# Patient Record
Sex: MALE | Race: WHITE | NOT HISPANIC OR LATINO | URBAN - METROPOLITAN AREA
[De-identification: names, ages, dates, MRNs, and addresses within clinical notes are randomized per-mention and may not be internally consistent; named-entity substitution may affect disease eponyms.]

---

## 2020-11-30 ENCOUNTER — TELEPHONE ENCOUNTER (OUTPATIENT)
Dept: URBAN - METROPOLITAN AREA CLINIC 35 | Facility: CLINIC | Age: 60
End: 2020-11-30

## 2020-12-19 ENCOUNTER — WEB ENCOUNTER (OUTPATIENT)
Dept: URBAN - METROPOLITAN AREA CLINIC 35 | Facility: CLINIC | Age: 60
End: 2020-12-19

## 2020-12-29 ENCOUNTER — TELEPHONE ENCOUNTER (OUTPATIENT)
Dept: URBAN - METROPOLITAN AREA CLINIC 35 | Facility: CLINIC | Age: 60
End: 2020-12-29

## 2021-01-11 ENCOUNTER — OFFICE VISIT (OUTPATIENT)
Dept: URBAN - METROPOLITAN AREA CLINIC 33 | Facility: CLINIC | Age: 61
End: 2021-01-11

## 2021-01-11 VITALS
DIASTOLIC BLOOD PRESSURE: 88 MMHG | SYSTOLIC BLOOD PRESSURE: 138 MMHG | HEART RATE: 85 BPM | HEIGHT: 70 IN | BODY MASS INDEX: 30.64 KG/M2 | WEIGHT: 214 LBS | OXYGEN SATURATION: 98 %

## 2021-01-11 PROBLEM — 64766004 ULCERATIVE COLITIS: Status: ACTIVE | Noted: 2021-01-11

## 2021-01-11 RX ORDER — MULTIVITAMIN
1 TABLET TABLET ORAL ONCE A DAY
Status: ACTIVE | COMMUNITY

## 2021-01-11 RX ORDER — MESALAMINE 1.2 G/1
2 TABLETS TABLET, DELAYED RELEASE ORAL ONCE A DAY
Qty: 60 | Refills: 2 | OUTPATIENT
Start: 2021-01-11

## 2021-01-11 RX ORDER — SODIUM PICOSULFATE, MAGNESIUM OXIDE, AND ANHYDROUS CITRIC ACID 10; 3.5; 12 MG/160ML; G/160ML; G/160ML
AS DIRECTED LIQUID ORAL
Qty: 1 KIT | OUTPATIENT
Start: 2021-01-11

## 2021-01-11 NOTE — EXAM-MIGRATED EXAMINATIONS
GENERAL APPEARANCE: - alert, in no acute distress, well developed, well nourished;   ORAL CAVITY: - Mallampati classIII;   HEART: - no murmurs, regular rate and rhythm, S1, S2 normal;   LUNGS: - clear to auscultation bilaterally, good air movement, no wheezes, rales, rhonchi;   ABDOMEN: - bowel sounds present, no masses palpable, no organomegaly , no rebound tenderness, soft, nontender, nondistended;

## 2021-01-11 NOTE — HPI-MIGRATED HPI
;     Ulcerative Colitis : 61 y/o male patient presents today with longstanding ulcerative colitis. Patient was first diagnosed about eight years ago. He was first diagnosed with Crohns disease and changed to Pancolitis.  His Ulcerative colitis is being treated with Humira, his last injection was today 01/11/2021.  Patient denies any abdominal pain/cramping, heartburn, bloating/ gas, nausea. Patient states about three months ago he started having symptoms of fecal urgency, severe diarrhea, abdominal cramping, admits to improvement of symptoms around 12/25/2020. When symptoms started he had a QDX stool study on 10/02/2020 with negative results.   Patient currently admits 4 bowel movements per day. Stools vary earlier in the day he will have formed stools until later in the day his stools will be loose with little form. He admits to taking super seed beyond fiber and olive tree leaf extract as supplements. Patient denies associated abdominal pain, bloating, gas, or heartburn. Denies any blood, mucus or melena.  Patient admits having a colonoscopy in 2019.  ;

## 2021-01-13 ENCOUNTER — TELEPHONE ENCOUNTER (OUTPATIENT)
Dept: URBAN - METROPOLITAN AREA SURGERY CENTER 8 | Facility: SURGERY CENTER | Age: 61
End: 2021-01-13

## 2021-02-02 ENCOUNTER — OFFICE VISIT (OUTPATIENT)
Dept: URBAN - METROPOLITAN AREA SURGERY CENTER 8 | Facility: SURGERY CENTER | Age: 61
End: 2021-02-02

## 2021-02-02 ENCOUNTER — TELEPHONE ENCOUNTER (OUTPATIENT)
Dept: URBAN - METROPOLITAN AREA CLINIC 35 | Facility: CLINIC | Age: 61
End: 2021-02-02

## 2021-02-02 RX ORDER — MESALAMINE 1.2 G/1
2 TABLETS TABLET, DELAYED RELEASE ORAL TWICE A DAY
Qty: 360 TABLET | OUTPATIENT
Start: 2021-01-11

## 2021-02-12 ENCOUNTER — TELEPHONE ENCOUNTER (OUTPATIENT)
Dept: URBAN - METROPOLITAN AREA CLINIC 35 | Facility: CLINIC | Age: 61
End: 2021-02-12

## 2021-02-15 ENCOUNTER — OFFICE VISIT (OUTPATIENT)
Dept: URBAN - METROPOLITAN AREA CLINIC 33 | Facility: CLINIC | Age: 61
End: 2021-02-15

## 2021-02-15 VITALS
SYSTOLIC BLOOD PRESSURE: 120 MMHG | HEIGHT: 70 IN | HEART RATE: 75 BPM | DIASTOLIC BLOOD PRESSURE: 80 MMHG | WEIGHT: 249 LBS | OXYGEN SATURATION: 98 % | BODY MASS INDEX: 35.65 KG/M2

## 2021-02-15 RX ORDER — MULTIVITAMIN
1 TABLET TABLET ORAL ONCE A DAY
Status: ACTIVE | COMMUNITY

## 2021-02-15 RX ORDER — MESALAMINE 1.2 G/1
2 TABLETS TABLET, DELAYED RELEASE ORAL TWICE A DAY
Qty: 360 TABLET | Status: ACTIVE | COMMUNITY
Start: 2021-01-11

## 2021-02-15 RX ORDER — SODIUM PICOSULFATE, MAGNESIUM OXIDE, AND ANHYDROUS CITRIC ACID 10; 3.5; 12 MG/160ML; G/160ML; G/160ML
AS DIRECTED LIQUID ORAL
Qty: 1 KIT | Status: ON HOLD | COMMUNITY
Start: 2021-01-11

## 2021-02-15 RX ORDER — MERCAPTOPURINE 50 MG/1
2 TABLETS TABLET ORAL DAILY
Qty: 180 | Refills: 1 | OUTPATIENT
Start: 2021-02-15

## 2021-02-15 NOTE — HPI-MIGRATED HPI
;     Ulcerative Colitis : Patient presents today for a follow up of Ulcerative Colitits and to review his colonoscopy results. Patent admits continuing Humira . He admirs having 4 bowel movements a day with loose and formed stools. Denies any blood, mucus or melena. He denies complications after his procedure. Patient admits to some lower back pain after his procedure, he followed up with his Chiropractors office to give him an adjustment. Patient admits to some gas and acid indigestion.   Last visit (1/11/2021) 59 y/o male patient presents today with longstanding ulcerative colitis. Patient was first diagnosed about eight years ago. He was first diagnosed with Crohns disease and changed to Pancolitis.  His Ulcerative colitis is being treated with Humira, his last injection was today 01/11/2021.  Patient denies any abdominal pain/cramping, heartburn, bloating/ gas, nausea. Patient states about three months ago he started having symptoms of fecal urgency, severe diarrhea, abdominal cramping, admits to improvement of symptoms around 12/25/2020. When symptoms started he had a QDX stool study on 10/02/2020 with negative results.   Patient currently admits 4 bowel movements per day. Stools vary earlier in the day he will have formed stools until later in the day his stools will be loose with little form. He admits to taking super seed beyond fiber and olive tree leaf extract as supplements. Patient denies associated abdominal pain, bloating, gas, or heartburn. Denies any blood, mucus or melena.  Patient admits having a colonoscopy in 2019.  ;

## 2021-03-29 ENCOUNTER — OFFICE VISIT (OUTPATIENT)
Dept: URBAN - METROPOLITAN AREA CLINIC 33 | Facility: CLINIC | Age: 61
End: 2021-03-29

## 2021-03-29 ENCOUNTER — TELEPHONE ENCOUNTER (OUTPATIENT)
Dept: URBAN - METROPOLITAN AREA CLINIC 35 | Facility: CLINIC | Age: 61
End: 2021-03-29

## 2021-03-29 VITALS
BODY MASS INDEX: 37.22 KG/M2 | SYSTOLIC BLOOD PRESSURE: 132 MMHG | HEART RATE: 67 BPM | DIASTOLIC BLOOD PRESSURE: 82 MMHG | HEIGHT: 70 IN | OXYGEN SATURATION: 97 % | WEIGHT: 260 LBS

## 2021-03-29 RX ORDER — MESALAMINE 1.2 G/1
2 TABLETS TABLET, DELAYED RELEASE ORAL TWICE A DAY
Qty: 360 TABLET | Status: ACTIVE | COMMUNITY
Start: 2021-01-11

## 2021-03-29 RX ORDER — MULTIVITAMIN
1 TABLET TABLET ORAL ONCE A DAY
Status: ACTIVE | COMMUNITY

## 2021-03-29 RX ORDER — SODIUM PICOSULFATE, MAGNESIUM OXIDE, AND ANHYDROUS CITRIC ACID 10; 3.5; 12 MG/160ML; G/160ML; G/160ML
AS DIRECTED LIQUID ORAL
Qty: 1 KIT | Status: ON HOLD | COMMUNITY
Start: 2021-01-11

## 2021-03-29 RX ORDER — MERCAPTOPURINE 50 MG/1
2 TABLETS TABLET ORAL DAILY
Qty: 180 | Refills: 1 | Status: ACTIVE | COMMUNITY
Start: 2021-02-15

## 2021-03-29 NOTE — HPI-MIGRATED HPI
;     Ulcerative Colitis : Patient presents for follow up of ulcerative colitis . He admits mild improvement. Patient denies symptoms are stable . He admits starting 6mp and mercaptapurine since last visit and he has stopped Humira.   He admits 5 bowel movements a day with 2 out of 5 stools being firm. Patient denies rectal bleeding or mucus present at this time.   Patient denies any new concerns at this time.   Of last visit (02/15/21) Patient presents today for a follow up of Ulcerative Colitits and to review his colonoscopy results. Patent admits continuing Humira . He admirs having 4 bowel movements a day with loose and formed stools. Denies any blood, mucus or melena. He denies complications after his procedure. Patient admits to some lower back pain after his procedure, he followed up with his Chiropractors office to give him an adjustment. Patient admits to some gas and acid indigestion.   Last visit (1/11/2021) 61 y/o male patient presents today with longstanding ulcerative colitis. Patient was first diagnosed about eight years ago. He was first diagnosed with Crohns disease and changed to Pancolitis.  His Ulcerative colitis is being treated with Humira, his last injection was today 01/11/2021.  Patient denies any abdominal pain/cramping, heartburn, bloating/ gas, nausea. Patient states about three months ago he started having symptoms of fecal urgency, severe diarrhea, abdominal cramping, admits to improvement of symptoms around 12/25/2020. When symptoms started he had a QDX stool study on 10/02/2020 with negative results.   Patient currently admits 4 bowel movements per day. Stools vary earlier in the day he will have formed stools until later in the day his stools will be loose with little form. He admits to taking super seed beyond fiber and olive tree leaf extract as supplements. Patient denies associated abdominal pain, bloating, gas, or heartburn. Denies any blood, mucus or melena.  Patient admits having a colonoscopy in 2019.;

## 2021-04-22 ENCOUNTER — TELEPHONE ENCOUNTER (OUTPATIENT)
Dept: URBAN - METROPOLITAN AREA CLINIC 35 | Facility: CLINIC | Age: 61
End: 2021-04-22

## 2021-04-22 LAB
6 MMP: (no result)
6 TG: 103
ABSOLUTE BASOPHILS: 10
ABSOLUTE EOSINOPHILS: 59
ABSOLUTE LYMPHOCYTES: 2004
ABSOLUTE MONOCYTES: 426
ABSOLUTE NEUTROPHILS: 2401
ALBUMIN/GLOBULIN RATIO: 1.4
ALBUMIN: 3.9
ALKALINE PHOSPHATASE: 60
ALT: 41
AST: 21
BASOPHILS: 0.2
BILIRUBIN, TOTAL: 0.5
BUN/CREATININE RATIO: (no result)
CALCIUM: 8.8
CARBON DIOXIDE: 26
CHLORIDE: 103
CREATININE: 0.82
EGFR AFRICAN AMERICAN: 110
EGFR NON-AFR. AMERICAN: 95
EOSINOPHILS: 1.2
GLOBULIN: 2.8
GLUCOSE: 225
HEMATOCRIT: 41
HEMOGLOBIN: 13.9
LYMPHOCYTES: 40.9
MCH: 29.5
MCHC: 33.9
MCV: 87
MONOCYTES: 8.7
MPV: 11.2
NEUTROPHILS: 49
PLATELET COUNT: 308
POTASSIUM: 4.4
PROTEIN, TOTAL: 6.7
RDW: 13.2
RED BLOOD CELL COUNT: 4.71
SODIUM: 138
UREA NITROGEN (BUN): 13
WHITE BLOOD CELL COUNT: 4.9

## 2021-04-26 ENCOUNTER — OFFICE VISIT (OUTPATIENT)
Dept: URBAN - METROPOLITAN AREA CLINIC 33 | Facility: CLINIC | Age: 61
End: 2021-04-26

## 2021-04-26 VITALS
HEIGHT: 70 IN | OXYGEN SATURATION: 98 % | DIASTOLIC BLOOD PRESSURE: 92 MMHG | SYSTOLIC BLOOD PRESSURE: 148 MMHG | HEART RATE: 72 BPM | WEIGHT: 248 LBS | BODY MASS INDEX: 35.5 KG/M2

## 2021-04-26 RX ORDER — MERCAPTOPURINE 50 MG/1
2 TABLETS TABLET ORAL DAILY
Qty: 180 | Refills: 1 | Status: ON HOLD | COMMUNITY
Start: 2021-02-15

## 2021-04-26 RX ORDER — MESALAMINE 1.2 G/1
2 TABLETS TABLET, DELAYED RELEASE ORAL TWICE A DAY
Qty: 360 TABLET | Status: ACTIVE | COMMUNITY
Start: 2021-01-11

## 2021-04-26 RX ORDER — SODIUM PICOSULFATE, MAGNESIUM OXIDE, AND ANHYDROUS CITRIC ACID 10; 3.5; 12 MG/160ML; G/160ML; G/160ML
AS DIRECTED LIQUID ORAL
Qty: 1 KIT | Status: ON HOLD | COMMUNITY
Start: 2021-01-11

## 2021-04-26 RX ORDER — MULTIVITAMIN
1 TABLET TABLET ORAL ONCE A DAY
Status: ACTIVE | COMMUNITY

## 2021-04-26 NOTE — HPI-MIGRATED HPI
;     Ulcerative Colitis : Patient presents for follow up of UC and labs . Patient denies any symptoms since last visit . Patient admits stopping 6MP per Dr Jerez due to elevated Lab results. Patient admits before stopping 6mp he was having lower back pains , since stopping pain has subsided. Admits 3 bowel movements a da with alternating stools from sof to watery. He denies blood or mucus in stool.     Of last visit (03/29/21) Patient presents for follow up of ulcerative colitis . He admits mild improvement. Patient denies symptoms are stable . He admits starting 6mp and mercaptapurine since last visit and he has stopped Humira.   He admits 5 bowel movements a day with 2 out of 5 stools being firm. Patient denies rectal bleeding or mucus present at this time.   Patient denies any new concerns at this time.   Of last visit (02/15/21) Patient presents today for a follow up of Ulcerative Colitits and to review his colonoscopy results. Patent admits continuing Humira . He admirs having 4 bowel movements a day with loose and formed stools. Denies any blood, mucus or melena. He denies complications after his procedure. Patient admits to some lower back pain after his procedure, he followed up with his Chiropractors office to give him an adjustment. Patient admits to some gas and acid indigestion.   Last visit (1/11/2021) 61 y/o male patient presents today with longstanding ulcerative colitis. Patient was first diagnosed about eight years ago. He was first diagnosed with Crohns disease and changed to Pancolitis.  His Ulcerative colitis is being treated with Humira, his last injection was today 01/11/2021.  Patient denies any abdominal pain/cramping, heartburn, bloating/ gas, nausea. Patient states about three months ago he started having symptoms of fecal urgency, severe diarrhea, abdominal cramping, admits to improvement of symptoms around 12/25/2020. When symptoms started he had a QDX stool study on 10/02/2020 with negative results.   Patient currently admits 4 bowel movements per day. Stools vary earlier in the day he will have formed stools until later in the day his stools will be loose with little form. He admits to taking super seed beyond fiber and olive tree leaf extract as supplements. Patient denies associated abdominal pain, bloating, gas, or heartburn. Denies any blood, mucus or melena.  Patient admits having a colonoscopy in 2019.;

## 2021-05-21 ENCOUNTER — WEB ENCOUNTER (OUTPATIENT)
Dept: URBAN - METROPOLITAN AREA CLINIC 35 | Facility: CLINIC | Age: 61
End: 2021-05-21

## 2021-05-21 RX ORDER — MESALAMINE 1.2 G/1
2 TABLETS TABLET, DELAYED RELEASE ORAL TWICE A DAY
Qty: 360 TABLET | Refills: 0 | OUTPATIENT
Start: 2021-01-11

## 2021-07-26 ENCOUNTER — OFFICE VISIT (OUTPATIENT)
Dept: URBAN - METROPOLITAN AREA CLINIC 33 | Facility: CLINIC | Age: 61
End: 2021-07-26

## 2021-08-16 ENCOUNTER — OFFICE VISIT (OUTPATIENT)
Dept: URBAN - METROPOLITAN AREA CLINIC 33 | Facility: CLINIC | Age: 61
End: 2021-08-16

## 2021-08-16 VITALS
SYSTOLIC BLOOD PRESSURE: 136 MMHG | OXYGEN SATURATION: 99 % | BODY MASS INDEX: 33.93 KG/M2 | DIASTOLIC BLOOD PRESSURE: 78 MMHG | HEART RATE: 75 BPM | HEIGHT: 70 IN | WEIGHT: 237 LBS

## 2021-08-16 RX ORDER — MULTIVITAMIN
1 TABLET TABLET ORAL ONCE A DAY
Status: ACTIVE | COMMUNITY

## 2021-08-16 RX ORDER — MESALAMINE 1.2 G/1
2 TABLETS TABLET, DELAYED RELEASE ORAL TWICE A DAY
Qty: 360 TABLET | Refills: 0 | Status: ACTIVE | COMMUNITY
Start: 2021-01-11

## 2021-08-16 RX ORDER — MESALAMINE 1.2 G/1
2 TABLETS TABLET, DELAYED RELEASE ORAL TWICE A DAY
Qty: 360 TABLET | Refills: 1 | OUTPATIENT
Start: 2021-08-16

## 2021-08-16 RX ORDER — SODIUM PICOSULFATE, MAGNESIUM OXIDE, AND ANHYDROUS CITRIC ACID 10; 3.5; 12 MG/160ML; G/160ML; G/160ML
AS DIRECTED LIQUID ORAL
Qty: 1 KIT | Status: ON HOLD | COMMUNITY
Start: 2021-01-11

## 2021-08-16 RX ORDER — MERCAPTOPURINE 50 MG/1
2 TABLETS TABLET ORAL DAILY
Qty: 180 | Refills: 1 | Status: ON HOLD | COMMUNITY
Start: 2021-02-15

## 2021-08-16 NOTE — HPI-MIGRATED HPI
;     Ulcerative Colitis :    Patient presents for follow up of UC. He is currently taking Mesalamine 1.2 GM 4 tabs daily with control of symptoms. Currently has 3-4 bowel movements a day with varying stools from soft with no form to watery. He denies blood or mucus in stool.  He is currently having occasional maybe once a week episodes of heartburn.       Of last visit (04/26/21)               Patient presents for follow up of UC and labs . Patient denies any symptoms since last visit . Patient admits stopping 6 MP per Dr Jerez due to elevated Lab results. Patient admits before stopping 6mp he was having lower back pains , since stopping pain has subsided. Admits 3 bowel movements a da with alternating stools from soft to watery. He denies blood or mucus in stool.     Of last visit (03/29/21) Patient presents for follow up of ulcerative colitis . He admits mild improvement. Patient denies symptoms are stable . He admits starting 6mp and mercaptopurine since last visit and he has stopped Humira.   He admits 5 bowel movements a day with 2 out of 5 stools being firm. Patient denies rectal bleeding or mucus present at this time.   Patient denies any new concerns at this time.   Of last visit (02/15/21) Patient presents today for a follow up of Ulcerative Colitis and to review his colonoscopy results. Patent admits continuing Humira . He admits having 4 bowel movements a day with loose and formed stools. Denies any blood, mucus or melena. He denies complications after his procedure. Patient admits to some lower back pain after his procedure, he followed up with his Chiropractors office to give him an adjustment. Patient admits to some gas and acid indigestion.   Last visit (1/11/2021) 61 y/o male patient presents today with longstanding ulcerative colitis. Patient was first diagnosed about eight years ago. He was first diagnosed with Crohn's disease and changed to Pancolitis.  His Ulcerative colitis is being treated with Humira, his last injection was today 01/11/2021.  Patient denies any abdominal pain/cramping, heartburn, bloating/ gas, nausea. Patient states about three months ago he started having symptoms of fecal urgency, severe diarrhea, abdominal cramping, admits to improvement of symptoms around 12/25/2020. When symptoms started he had a QDX stool study on 10/02/2020 with negative results.   Patient currently admits 4 bowel movements per day. Stools vary earlier in the day he will have formed stools until later in the day his stools will be loose with little form. He admits to taking super seed beyond fiber and olive tree leaf extract as supplements. Patient denies associated abdominal pain, bloating, gas, or heartburn. Denies any blood, mucus or melena.  Patient admits having a colonoscopy in 2019.;

## 2022-02-07 ENCOUNTER — DASHBOARD ENCOUNTERS (OUTPATIENT)
Age: 62
End: 2022-02-07

## 2022-02-07 ENCOUNTER — OFFICE VISIT (OUTPATIENT)
Dept: URBAN - METROPOLITAN AREA CLINIC 33 | Facility: CLINIC | Age: 62
End: 2022-02-07
Payer: COMMERCIAL

## 2022-02-07 VITALS
HEIGHT: 70 IN | BODY MASS INDEX: 33.36 KG/M2 | HEART RATE: 71 BPM | SYSTOLIC BLOOD PRESSURE: 148 MMHG | OXYGEN SATURATION: 98 % | WEIGHT: 233 LBS | DIASTOLIC BLOOD PRESSURE: 82 MMHG

## 2022-02-07 DIAGNOSIS — K51.00 ULCERATIVE PANCOLITIS WITHOUT COMPLICATION: ICD-10-CM

## 2022-02-07 PROBLEM — 444548001: Status: ACTIVE | Noted: 2022-02-07

## 2022-02-07 PROCEDURE — 99213 OFFICE O/P EST LOW 20 MIN: CPT | Performed by: INTERNAL MEDICINE

## 2022-02-07 RX ORDER — MESALAMINE 1.2 G/1
2 TABLETS TABLET, DELAYED RELEASE ORAL TWICE A DAY
Qty: 360 TABLET | Refills: 1 | OUTPATIENT
Start: 2021-08-16 | End: 2022-08-06

## 2022-02-07 RX ORDER — MULTIVITAMIN
1 TABLET TABLET ORAL ONCE A DAY
Status: ACTIVE | COMMUNITY

## 2022-02-07 RX ORDER — MESALAMINE 1.2 G/1
2 TABLETS TABLET, DELAYED RELEASE ORAL TWICE A DAY
Qty: 360 TABLET | Refills: 1 | Status: ACTIVE | COMMUNITY
Start: 2021-08-16

## 2022-02-07 RX ORDER — SODIUM PICOSULFATE, MAGNESIUM OXIDE, AND ANHYDROUS CITRIC ACID 10; 3.5; 12 MG/160ML; G/160ML; G/160ML
AS DIRECTED LIQUID ORAL
Qty: 1 KIT | Status: ON HOLD | COMMUNITY
Start: 2021-01-11

## 2022-02-07 RX ORDER — MERCAPTOPURINE 50 MG/1
2 TABLETS TABLET ORAL DAILY
Qty: 180 | Refills: 1 | Status: ON HOLD | COMMUNITY
Start: 2021-02-15

## 2022-02-07 NOTE — HPI-MIGRATED HPI
;     Ulcerative Colitis :  Patient presents for follow up of Ulcerative colits . he admits continuing Mesalamine 1.2 GM 4 tabs a day . He admits 3-4 bowel movements a day with normal stools . He admits seeing blood in stool internittently . He denies abdominal pain.      Of last visit (08/16/2021)  Patient presents for follow up of UC. He is currently taking Mesalamine 1.2 GM 4 tabs daily with control of symptoms. Currently has 3-4 bowel movements a day with varying stools from soft with no form to watery. He denies blood or mucus in stool.  He is currently having occasional maybe once a week episodes of heartburn.       Of last visit (04/26/21)               Patient presents for follow up of UC and labs . Patient denies any symptoms since last visit . Patient admits stopping 6 MP per Dr eJrez due to elevated Lab results. Patient admits before stopping 6mp he was having lower back pains , since stopping pain has subsided. Admits 3 bowel movements a da with alternating stools from soft to watery. He denies blood or mucus in stool.     Of last visit (03/29/21) Patient presents for follow up of ulcerative colitis . He admits mild improvement. Patient denies symptoms are stable . He admits starting 6mp and mercaptopurine since last visit and he has stopped Humira.   He admits 5 bowel movements a day with 2 out of 5 stools being firm. Patient denies rectal bleeding or mucus present at this time.   Patient denies any new concerns at this time.  Pt presents for follow up of Ulcerative colitis . He admits/denies continuing Humira . He admits __bowel movements a day with __stools .        Of last visit (02/15/21) Patient presents today for a follow up of Ulcerative Colitis and to review his colonoscopy results. Patent admits continuing Humira . He admits having 4 bowel movements a day with loose and formed stools. Denies any blood, mucus or melena. He denies complications after his procedure. Patient admits to some lower back pain after his procedure, he followed up with his Chiropractors office to give him an adjustment. Patient admits to some gas and acid indigestion.   Last visit (1/11/2021) 59 y/o male patient presents today with longstanding ulcerative colitis. Patient was first diagnosed about eight years ago. He was first diagnosed with Crohn's disease and changed to Pancolitis.  His Ulcerative colitis is being treated with Humira, his last injection was today 01/11/2021.  Patient denies any abdominal pain/cramping, heartburn, bloating/ gas, nausea. Patient states about three months ago he started having symptoms of fecal urgency, severe diarrhea, abdominal cramping, admits to improvement of symptoms around 12/25/2020. When symptoms started he had a QDX stool study on 10/02/2020 with negative results.   Patient currently admits 4 bowel movements per day. Stools vary earlier in the day he will have formed stools until later in the day his stools will be loose with little form. He admits to taking super seed beyond fiber and olive tree leaf extract as supplements. Patient denies associated abdominal pain, bloating, gas, or heartburn. Denies any blood, mucus or melena.  Patient admits having a colonoscopy in 2019.;

## 2022-08-08 ENCOUNTER — OFFICE VISIT (OUTPATIENT)
Dept: URBAN - METROPOLITAN AREA CLINIC 33 | Facility: CLINIC | Age: 62
End: 2022-08-08